# Patient Record
Sex: FEMALE | Race: WHITE | NOT HISPANIC OR LATINO | Employment: OTHER | URBAN - METROPOLITAN AREA
[De-identification: names, ages, dates, MRNs, and addresses within clinical notes are randomized per-mention and may not be internally consistent; named-entity substitution may affect disease eponyms.]

---

## 2017-01-07 ENCOUNTER — APPOINTMENT (EMERGENCY)
Dept: RADIOLOGY | Facility: HOSPITAL | Age: 82
End: 2017-01-07
Payer: MEDICARE

## 2017-01-07 ENCOUNTER — APPOINTMENT (EMERGENCY)
Dept: CT IMAGING | Facility: HOSPITAL | Age: 82
End: 2017-01-07
Payer: MEDICARE

## 2017-01-07 ENCOUNTER — HOSPITAL ENCOUNTER (EMERGENCY)
Facility: HOSPITAL | Age: 82
Discharge: HOME | End: 2017-01-07
Attending: EMERGENCY MEDICINE | Admitting: EMERGENCY MEDICINE
Payer: MEDICARE

## 2017-01-07 VITALS
HEART RATE: 61 BPM | DIASTOLIC BLOOD PRESSURE: 89 MMHG | RESPIRATION RATE: 18 BRPM | WEIGHT: 113.54 LBS | SYSTOLIC BLOOD PRESSURE: 204 MMHG | TEMPERATURE: 97.6 F | OXYGEN SATURATION: 98 %

## 2017-01-07 DIAGNOSIS — S20.229A CONTUSION, BACK: ICD-10-CM

## 2017-01-07 DIAGNOSIS — Z91.81 STATUS POST FALL: Primary | ICD-10-CM

## 2017-01-07 LAB
ALBUMIN SERPL BCP-MCNC: 3.5 G/DL (ref 3.5–5)
ALP SERPL-CCNC: 60 U/L (ref 46–116)
ALT SERPL W P-5'-P-CCNC: 22 U/L (ref 12–78)
ANION GAP SERPL CALCULATED.3IONS-SCNC: 7 MMOL/L (ref 4–13)
AST SERPL W P-5'-P-CCNC: 27 U/L (ref 5–45)
ATRIAL RATE: 62 BPM
BASOPHILS # BLD AUTO: 0.04 THOUSANDS/ΜL (ref 0–0.1)
BASOPHILS NFR BLD AUTO: 0 % (ref 0–1)
BILIRUB SERPL-MCNC: 1.5 MG/DL (ref 0.2–1)
BUN SERPL-MCNC: 29 MG/DL (ref 5–25)
CALCIUM SERPL-MCNC: 8.9 MG/DL (ref 8.3–10.1)
CHLORIDE SERPL-SCNC: 106 MMOL/L (ref 100–108)
CK MB SERPL-MCNC: 1.5 % (ref 0–2.5)
CK MB SERPL-MCNC: 2.9 NG/ML (ref 0–5)
CK SERPL-CCNC: 193 U/L (ref 26–192)
CLARITY, POC: CLEAR
CO2 SERPL-SCNC: 30 MMOL/L (ref 21–32)
COLOR, POC: YELLOW
CREAT SERPL-MCNC: 0.89 MG/DL (ref 0.6–1.3)
EOSINOPHIL # BLD AUTO: 0.11 THOUSAND/ΜL (ref 0–0.61)
EOSINOPHIL NFR BLD AUTO: 1 % (ref 0–6)
ERYTHROCYTE [DISTWIDTH] IN BLOOD BY AUTOMATED COUNT: 13.6 % (ref 11.6–15.1)
EXT BILIRUBIN, UA: NEGATIVE
EXT BLOOD URINE: NEGATIVE
EXT GLUCOSE, UA: NEGATIVE
EXT KETONES: NEGATIVE
EXT NITRITE, UA: NEGATIVE
EXT PH, UA: 6.5
EXT PROTEIN, UA: NORMAL
EXT SPECIFIC GRAVITY, UA: 1.02
EXT UROBILINOGEN: 0.2
GFR SERPL CREATININE-BSD FRML MDRD: 59.9 ML/MIN/1.73SQ M
GLUCOSE SERPL-MCNC: 104 MG/DL (ref 65–140)
HCT VFR BLD AUTO: 37.5 % (ref 34.8–46.1)
HGB BLD-MCNC: 12.2 G/DL (ref 11.5–15.4)
LYMPHOCYTES # BLD AUTO: 1.13 THOUSANDS/ΜL (ref 0.6–4.47)
LYMPHOCYTES NFR BLD AUTO: 10 % (ref 14–44)
MCH RBC QN AUTO: 31.4 PG (ref 26.8–34.3)
MCHC RBC AUTO-ENTMCNC: 32.5 G/DL (ref 31.4–37.4)
MCV RBC AUTO: 96 FL (ref 82–98)
MONOCYTES # BLD AUTO: 0.71 THOUSAND/ΜL (ref 0.17–1.22)
MONOCYTES NFR BLD AUTO: 6 % (ref 4–12)
NEUTROPHILS # BLD AUTO: 9.02 THOUSANDS/ΜL (ref 1.85–7.62)
NEUTS SEG NFR BLD AUTO: 83 % (ref 43–75)
P AXIS: 66 DEGREES
PLATELET # BLD AUTO: 228 THOUSANDS/UL (ref 149–390)
PMV BLD AUTO: 9.1 FL (ref 8.9–12.7)
POTASSIUM SERPL-SCNC: 3.9 MMOL/L (ref 3.5–5.3)
PR INTERVAL: 134 MS
PROT SERPL-MCNC: 6.9 G/DL (ref 6.4–8.2)
QRS AXIS: 30 DEGREES
QRSD INTERVAL: 78 MS
QT INTERVAL: 442 MS
QTC INTERVAL: 442 MS
RBC # BLD AUTO: 3.89 MILLION/UL (ref 3.81–5.12)
SODIUM SERPL-SCNC: 143 MMOL/L (ref 136–145)
T WAVE AXIS: 68 DEGREES
TROPONIN I SERPL-MCNC: <0.02 NG/ML
VENTRICULAR RATE: 60 BPM
WBC # BLD AUTO: 11.01 THOUSAND/UL (ref 4.31–10.16)
WBC # BLD EST: NEGATIVE 10*3/UL

## 2017-01-07 PROCEDURE — 93005 ELECTROCARDIOGRAM TRACING: CPT

## 2017-01-07 PROCEDURE — 85025 COMPLETE CBC W/AUTO DIFF WBC: CPT | Performed by: EMERGENCY MEDICINE

## 2017-01-07 PROCEDURE — 99285 EMERGENCY DEPT VISIT HI MDM: CPT

## 2017-01-07 PROCEDURE — 81002 URINALYSIS NONAUTO W/O SCOPE: CPT | Performed by: EMERGENCY MEDICINE

## 2017-01-07 PROCEDURE — 82550 ASSAY OF CK (CPK): CPT | Performed by: EMERGENCY MEDICINE

## 2017-01-07 PROCEDURE — 36415 COLL VENOUS BLD VENIPUNCTURE: CPT | Performed by: EMERGENCY MEDICINE

## 2017-01-07 PROCEDURE — 72100 X-RAY EXAM L-S SPINE 2/3 VWS: CPT

## 2017-01-07 PROCEDURE — 80053 COMPREHEN METABOLIC PANEL: CPT | Performed by: EMERGENCY MEDICINE

## 2017-01-07 PROCEDURE — 82553 CREATINE MB FRACTION: CPT | Performed by: EMERGENCY MEDICINE

## 2017-01-07 PROCEDURE — 70450 CT HEAD/BRAIN W/O DYE: CPT

## 2017-01-07 PROCEDURE — 72125 CT NECK SPINE W/O DYE: CPT

## 2017-01-07 PROCEDURE — 72070 X-RAY EXAM THORAC SPINE 2VWS: CPT

## 2017-01-07 PROCEDURE — 73502 X-RAY EXAM HIP UNI 2-3 VIEWS: CPT

## 2017-01-07 PROCEDURE — 84484 ASSAY OF TROPONIN QUANT: CPT | Performed by: EMERGENCY MEDICINE

## 2017-01-07 RX ORDER — ACETAMINOPHEN 325 MG/1
650 TABLET ORAL ONCE
Status: DISCONTINUED | OUTPATIENT
Start: 2017-01-07 | End: 2017-01-07 | Stop reason: HOSPADM

## 2017-01-09 ENCOUNTER — APPOINTMENT (EMERGENCY)
Dept: RADIOLOGY | Facility: HOSPITAL | Age: 82
End: 2017-01-09
Payer: MEDICARE

## 2017-01-09 ENCOUNTER — HOSPITAL ENCOUNTER (EMERGENCY)
Facility: HOSPITAL | Age: 82
Discharge: HOME | End: 2017-01-09
Attending: EMERGENCY MEDICINE | Admitting: EMERGENCY MEDICINE
Payer: MEDICARE

## 2017-01-09 ENCOUNTER — GENERIC CONVERSION - ENCOUNTER (OUTPATIENT)
Dept: OTHER | Facility: OTHER | Age: 82
End: 2017-01-09

## 2017-01-09 VITALS
RESPIRATION RATE: 16 BRPM | TEMPERATURE: 97.8 F | SYSTOLIC BLOOD PRESSURE: 189 MMHG | OXYGEN SATURATION: 99 % | WEIGHT: 127.9 LBS | HEART RATE: 61 BPM | DIASTOLIC BLOOD PRESSURE: 55 MMHG

## 2017-01-09 DIAGNOSIS — IMO0001 FALL IN ELDERLY PATIENT, INITIAL ENCOUNTER: ICD-10-CM

## 2017-01-09 DIAGNOSIS — S40.012A CONTUSION OF LEFT SHOULDER, INITIAL ENCOUNTER: Primary | ICD-10-CM

## 2017-01-09 PROCEDURE — 73030 X-RAY EXAM OF SHOULDER: CPT

## 2017-01-09 PROCEDURE — 99284 EMERGENCY DEPT VISIT MOD MDM: CPT

## 2017-01-12 ENCOUNTER — TRANSCRIBE ORDERS (OUTPATIENT)
Dept: ADMINISTRATIVE | Facility: HOSPITAL | Age: 82
End: 2017-01-12

## 2017-01-18 ENCOUNTER — GENERIC CONVERSION - ENCOUNTER (OUTPATIENT)
Dept: OTHER | Facility: OTHER | Age: 82
End: 2017-01-18

## 2017-01-20 ENCOUNTER — GENERIC CONVERSION - ENCOUNTER (OUTPATIENT)
Dept: OTHER | Facility: OTHER | Age: 82
End: 2017-01-20

## 2017-02-13 ENCOUNTER — GENERIC CONVERSION - ENCOUNTER (OUTPATIENT)
Dept: OTHER | Facility: OTHER | Age: 82
End: 2017-02-13

## 2017-02-18 ENCOUNTER — HOSPITAL ENCOUNTER (EMERGENCY)
Facility: HOSPITAL | Age: 82
Discharge: HOME | End: 2017-02-18
Attending: EMERGENCY MEDICINE
Payer: MEDICARE

## 2017-02-18 VITALS
RESPIRATION RATE: 18 BRPM | OXYGEN SATURATION: 94 % | TEMPERATURE: 97.4 F | SYSTOLIC BLOOD PRESSURE: 116 MMHG | DIASTOLIC BLOOD PRESSURE: 57 MMHG | HEART RATE: 74 BPM

## 2017-02-18 DIAGNOSIS — S30.0XXA CONTUSION, BUTTOCK, INITIAL ENCOUNTER: ICD-10-CM
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
## 2017-02-18 DIAGNOSIS — F03.90 DEMENTIA (HCC): ICD-10-CM

## 2017-02-18 PROCEDURE — 99284 EMERGENCY DEPT VISIT MOD MDM: CPT

## 2017-02-23 ENCOUNTER — GENERIC CONVERSION - ENCOUNTER (OUTPATIENT)
Dept: OTHER | Facility: OTHER | Age: 82
End: 2017-02-23

## 2017-02-28 ENCOUNTER — GENERIC CONVERSION - ENCOUNTER (OUTPATIENT)
Dept: OTHER | Facility: OTHER | Age: 82
End: 2017-02-28

## 2017-03-11 ENCOUNTER — HOSPITAL ENCOUNTER (EMERGENCY)
Facility: HOSPITAL | Age: 82
Discharge: NURSING, REHAB OR LONG-TERM CARE | End: 2017-03-11
Attending: EMERGENCY MEDICINE | Admitting: EMERGENCY MEDICINE
Payer: MEDICARE

## 2017-03-11 VITALS
HEART RATE: 65 BPM | TEMPERATURE: 97.2 F | OXYGEN SATURATION: 97 % | RESPIRATION RATE: 16 BRPM | SYSTOLIC BLOOD PRESSURE: 179 MMHG | DIASTOLIC BLOOD PRESSURE: 78 MMHG
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
## 2017-03-11 PROCEDURE — 99284 EMERGENCY DEPT VISIT MOD MDM: CPT

## 2017-03-11 RX ORDER — ALPRAZOLAM 0.25 MG/1
0.25 TABLET ORAL 2 TIMES DAILY
COMMUNITY

## 2017-04-20 ENCOUNTER — HOSPITAL ENCOUNTER (INPATIENT)
Facility: HOSPITAL | Age: 82
LOS: 1 days | Discharge: HOME | DRG: 093 | End: 2017-04-21
Attending: EMERGENCY MEDICINE | Admitting: INTERNAL MEDICINE
Payer: MEDICARE

## 2017-04-20 ENCOUNTER — APPOINTMENT (INPATIENT)
Dept: MRI IMAGING | Facility: HOSPITAL | Age: 82
DRG: 093 | End: 2017-04-20
Payer: MEDICARE

## 2017-04-20 ENCOUNTER — APPOINTMENT (EMERGENCY)
Dept: CT IMAGING | Facility: HOSPITAL | Age: 82
DRG: 093 | End: 2017-04-20
Payer: MEDICARE

## 2017-04-20 ENCOUNTER — APPOINTMENT (EMERGENCY)
Dept: RADIOLOGY | Facility: HOSPITAL | Age: 82
DRG: 093 | End: 2017-04-20
Payer: MEDICARE

## 2017-04-20 DIAGNOSIS — F03.90 DEMENTIA (HCC): Chronic | ICD-10-CM

## 2017-04-20 DIAGNOSIS — R29.810 FACIAL DROOP: Primary | ICD-10-CM

## 2017-04-20 DIAGNOSIS — Z86.73 HISTORY OF TIA (TRANSIENT ISCHEMIC ATTACK): Chronic | ICD-10-CM

## 2017-04-20 PROBLEM — I25.10 CAD (CORONARY ARTERY DISEASE): Chronic | Status: ACTIVE | Noted: 2017-04-20

## 2017-04-20 PROBLEM — I10 ESSENTIAL HYPERTENSION: Chronic | Status: ACTIVE | Noted: 2017-04-20

## 2017-04-20 PROBLEM — E78.5 HLD (HYPERLIPIDEMIA): Chronic | Status: ACTIVE | Noted: 2017-04-20

## 2017-04-20 LAB
ABO GROUP BLD: NORMAL
ANION GAP SERPL CALCULATED.3IONS-SCNC: 6 MMOL/L (ref 4–13)
APTT PPP: 25 SECONDS (ref 24–36)
BLD GP AB SCN SERPL QL: NEGATIVE
BUN SERPL-MCNC: 24 MG/DL (ref 5–25)
CALCIUM SERPL-MCNC: 8.8 MG/DL (ref 8.3–10.1)
CHLORIDE SERPL-SCNC: 107 MMOL/L (ref 100–108)
CO2 SERPL-SCNC: 30 MMOL/L (ref 21–32)
CREAT SERPL-MCNC: 0.86 MG/DL (ref 0.6–1.3)
ERYTHROCYTE [DISTWIDTH] IN BLOOD BY AUTOMATED COUNT: 13.5 % (ref 11.6–15.1)
GFR SERPL CREATININE-BSD FRML MDRD: >60 ML/MIN/1.73SQ M
GLUCOSE SERPL-MCNC: 99 MG/DL (ref 65–140)
HCT VFR BLD AUTO: 36.5 % (ref 34.8–46.1)
HGB BLD-MCNC: 11.3 G/DL (ref 11.5–15.4)
INR PPP: 1.01 (ref 0.86–1.16)
MCH RBC QN AUTO: 30.2 PG (ref 26.8–34.3)
MCHC RBC AUTO-ENTMCNC: 31 G/DL (ref 31.4–37.4)
MCV RBC AUTO: 98 FL (ref 82–98)
PA ADP BLD-ACNC: 491 ARU
PLATELET # BLD AUTO: 236 THOUSANDS/UL (ref 149–390)
PMV BLD AUTO: 9.3 FL (ref 8.9–12.7)
POTASSIUM SERPL-SCNC: 4.4 MMOL/L (ref 3.5–5.3)
PROTHROMBIN TIME: 13.1 SECONDS (ref 12–14.3)
RBC # BLD AUTO: 3.74 MILLION/UL (ref 3.81–5.12)
RH BLD: POSITIVE
SODIUM SERPL-SCNC: 143 MMOL/L (ref 136–145)
SPECIMEN EXPIRATION DATE: NORMAL
TROPONIN I SERPL-MCNC: <0.02 NG/ML
WBC # BLD AUTO: 12.72 THOUSAND/UL (ref 4.31–10.16)

## 2017-04-20 PROCEDURE — 85027 COMPLETE CBC AUTOMATED: CPT | Performed by: PHYSICIAN ASSISTANT

## 2017-04-20 PROCEDURE — 86900 BLOOD TYPING SEROLOGIC ABO: CPT | Performed by: PHYSICIAN ASSISTANT

## 2017-04-20 PROCEDURE — 70551 MRI BRAIN STEM W/O DYE: CPT

## 2017-04-20 PROCEDURE — 86901 BLOOD TYPING SEROLOGIC RH(D): CPT | Performed by: PHYSICIAN ASSISTANT

## 2017-04-20 PROCEDURE — 36415 COLL VENOUS BLD VENIPUNCTURE: CPT | Performed by: PHYSICIAN ASSISTANT

## 2017-04-20 PROCEDURE — 99285 EMERGENCY DEPT VISIT HI MDM: CPT

## 2017-04-20 PROCEDURE — 93005 ELECTROCARDIOGRAM TRACING: CPT | Performed by: PHYSICIAN ASSISTANT

## 2017-04-20 PROCEDURE — 70450 CT HEAD/BRAIN W/O DYE: CPT

## 2017-04-20 PROCEDURE — 80048 BASIC METABOLIC PNL TOTAL CA: CPT | Performed by: PHYSICIAN ASSISTANT

## 2017-04-20 PROCEDURE — 85730 THROMBOPLASTIN TIME PARTIAL: CPT | Performed by: PHYSICIAN ASSISTANT

## 2017-04-20 PROCEDURE — 84484 ASSAY OF TROPONIN QUANT: CPT | Performed by: PHYSICIAN ASSISTANT

## 2017-04-20 PROCEDURE — 71010 HB CHEST X-RAY 1 VIEW FRONTAL (PORTABLE): CPT

## 2017-04-20 PROCEDURE — 85610 PROTHROMBIN TIME: CPT | Performed by: PHYSICIAN ASSISTANT

## 2017-04-20 PROCEDURE — 86850 RBC ANTIBODY SCREEN: CPT | Performed by: PHYSICIAN ASSISTANT

## 2017-04-20 PROCEDURE — 85576 BLOOD PLATELET AGGREGATION: CPT | Performed by: PHYSICIAN ASSISTANT

## 2017-04-20 RX ORDER — ACETAMINOPHEN 325 MG/1
650 TABLET ORAL EVERY 6 HOURS PRN
Status: DISCONTINUED | OUTPATIENT
Start: 2017-04-20 | End: 2017-04-21 | Stop reason: HOSPADM

## 2017-04-20 RX ORDER — ASPIRIN 81 MG/1
324 TABLET, CHEWABLE ORAL DAILY
Status: DISCONTINUED | OUTPATIENT
Start: 2017-04-21 | End: 2017-04-21 | Stop reason: HOSPADM

## 2017-04-20 RX ORDER — ONDANSETRON 2 MG/ML
4 INJECTION INTRAMUSCULAR; INTRAVENOUS EVERY 6 HOURS PRN
Status: DISCONTINUED | OUTPATIENT
Start: 2017-04-20 | End: 2017-04-21 | Stop reason: HOSPADM

## 2017-04-20 RX ORDER — ECHINACEA PURPUREA EXTRACT 125 MG
2 TABLET ORAL AS NEEDED
Status: DISCONTINUED | OUTPATIENT
Start: 2017-04-20 | End: 2017-04-21 | Stop reason: HOSPADM

## 2017-04-20 RX ORDER — LORAZEPAM 2 MG/ML
1 INJECTION INTRAMUSCULAR ONCE
Status: COMPLETED | OUTPATIENT
Start: 2017-04-20 | End: 2017-04-20

## 2017-04-20 RX ORDER — ATORVASTATIN CALCIUM 40 MG/1
80 TABLET, FILM COATED ORAL
Status: DISCONTINUED | OUTPATIENT
Start: 2017-04-20 | End: 2017-04-21 | Stop reason: HOSPADM

## 2017-04-20 RX ORDER — SENNOSIDES 8.6 MG
1 TABLET ORAL DAILY
Status: DISCONTINUED | OUTPATIENT
Start: 2017-04-21 | End: 2017-04-21 | Stop reason: HOSPADM

## 2017-04-20 RX ORDER — DOCUSATE SODIUM 100 MG/1
100 CAPSULE, LIQUID FILLED ORAL 2 TIMES DAILY
Status: DISCONTINUED | OUTPATIENT
Start: 2017-04-20 | End: 2017-04-21 | Stop reason: HOSPADM

## 2017-04-20 RX ORDER — CITALOPRAM 10 MG/1
10 TABLET ORAL DAILY
COMMUNITY
End: 2017-06-08

## 2017-04-20 RX ORDER — CITALOPRAM 20 MG/1
10 TABLET ORAL DAILY
Status: DISCONTINUED | OUTPATIENT
Start: 2017-04-21 | End: 2017-04-21 | Stop reason: HOSPADM

## 2017-04-20 RX ORDER — ALPRAZOLAM 0.25 MG/1
0.25 TABLET ORAL EVERY 12 HOURS PRN
Status: DISCONTINUED | OUTPATIENT
Start: 2017-04-20 | End: 2017-04-21 | Stop reason: HOSPADM

## 2017-04-20 RX ORDER — MELATONIN
1000 DAILY
Status: DISCONTINUED | OUTPATIENT
Start: 2017-04-21 | End: 2017-04-21 | Stop reason: HOSPADM

## 2017-04-20 RX ORDER — ATENOLOL 25 MG/1
12.5 TABLET ORAL DAILY
Status: DISCONTINUED | OUTPATIENT
Start: 2017-04-21 | End: 2017-04-21 | Stop reason: HOSPADM

## 2017-04-20 RX ORDER — SODIUM CHLORIDE 9 MG/ML
75 INJECTION, SOLUTION INTRAVENOUS ONCE
Status: COMPLETED | OUTPATIENT
Start: 2017-04-20 | End: 2017-04-20

## 2017-04-20 RX ORDER — MEMANTINE HYDROCHLORIDE 10 MG/1
10 TABLET ORAL 2 TIMES DAILY
Status: DISCONTINUED | OUTPATIENT
Start: 2017-04-20 | End: 2017-04-21 | Stop reason: HOSPADM

## 2017-04-20 RX ORDER — FOLIC ACID 1 MG/1
1 TABLET ORAL DAILY
Status: DISCONTINUED | OUTPATIENT
Start: 2017-04-21 | End: 2017-04-21 | Stop reason: HOSPADM

## 2017-04-20 RX ADMIN — BIMATOPROST 1 DROP: 0.1 SOLUTION/ DROPS OPHTHALMIC at 21:32

## 2017-04-20 RX ADMIN — SODIUM CHLORIDE 75 ML/HR: 0.9 INJECTION, SOLUTION INTRAVENOUS at 18:47

## 2017-04-20 RX ADMIN — SODIUM CHLORIDE 1000 ML: 0.9 INJECTION, SOLUTION INTRAVENOUS at 15:15

## 2017-04-20 RX ADMIN — LORAZEPAM 1 MG: 2 INJECTION, SOLUTION INTRAMUSCULAR; INTRAVENOUS at 20:40

## 2017-04-21 VITALS
WEIGHT: 129 LBS | HEART RATE: 75 BPM | OXYGEN SATURATION: 95 % | BODY MASS INDEX: 22.02 KG/M2 | RESPIRATION RATE: 16 BRPM | HEIGHT: 64 IN | TEMPERATURE: 97.2 F | DIASTOLIC BLOOD PRESSURE: 70 MMHG | SYSTOLIC BLOOD PRESSURE: 130 MMHG

## 2017-04-21 LAB
ANION GAP SERPL CALCULATED.3IONS-SCNC: 9 MMOL/L (ref 4–13)
BUN SERPL-MCNC: 16 MG/DL (ref 5–25)
CALCIUM SERPL-MCNC: 8.1 MG/DL (ref 8.3–10.1)
CHLORIDE SERPL-SCNC: 105 MMOL/L (ref 100–108)
CHOLEST SERPL-MCNC: 148 MG/DL (ref 50–200)
CO2 SERPL-SCNC: 27 MMOL/L (ref 21–32)
CREAT SERPL-MCNC: 0.67 MG/DL (ref 0.6–1.3)
ERYTHROCYTE [DISTWIDTH] IN BLOOD BY AUTOMATED COUNT: 13.4 % (ref 11.6–15.1)
GFR SERPL CREATININE-BSD FRML MDRD: >60 ML/MIN/1.73SQ M
GLUCOSE SERPL-MCNC: 90 MG/DL (ref 65–140)
HCT VFR BLD AUTO: 33.7 % (ref 34.8–46.1)
HDLC SERPL-MCNC: 85 MG/DL (ref 40–60)
HGB BLD-MCNC: 10.6 G/DL (ref 11.5–15.4)
LDLC SERPL CALC-MCNC: 51 MG/DL (ref 0–100)
MAGNESIUM SERPL-MCNC: 2.1 MG/DL (ref 1.6–2.6)
MCH RBC QN AUTO: 30.1 PG (ref 26.8–34.3)
MCHC RBC AUTO-ENTMCNC: 31.5 G/DL (ref 31.4–37.4)
MCV RBC AUTO: 96 FL (ref 82–98)
PLATELET # BLD AUTO: 212 THOUSANDS/UL (ref 149–390)
PMV BLD AUTO: 8.9 FL (ref 8.9–12.7)
POTASSIUM SERPL-SCNC: 4.8 MMOL/L (ref 3.5–5.3)
RBC # BLD AUTO: 3.52 MILLION/UL (ref 3.81–5.12)
SODIUM SERPL-SCNC: 141 MMOL/L (ref 136–145)
TRIGL SERPL-MCNC: 62 MG/DL
WBC # BLD AUTO: 6.66 THOUSAND/UL (ref 4.31–10.16)

## 2017-04-21 PROCEDURE — G8978 MOBILITY CURRENT STATUS: HCPCS

## 2017-04-21 PROCEDURE — 92610 EVALUATE SWALLOWING FUNCTION: CPT

## 2017-04-21 PROCEDURE — 97163 PT EVAL HIGH COMPLEX 45 MIN: CPT

## 2017-04-21 PROCEDURE — G8979 MOBILITY GOAL STATUS: HCPCS

## 2017-04-21 PROCEDURE — 83735 ASSAY OF MAGNESIUM: CPT | Performed by: PHYSICIAN ASSISTANT

## 2017-04-21 PROCEDURE — 85027 COMPLETE CBC AUTOMATED: CPT | Performed by: PHYSICIAN ASSISTANT

## 2017-04-21 PROCEDURE — 80048 BASIC METABOLIC PNL TOTAL CA: CPT | Performed by: PHYSICIAN ASSISTANT

## 2017-04-21 PROCEDURE — 97110 THERAPEUTIC EXERCISES: CPT

## 2017-04-21 PROCEDURE — 80061 LIPID PANEL: CPT | Performed by: PHYSICIAN ASSISTANT

## 2017-04-21 RX ADMIN — ASPIRIN 324 MG: 81 TABLET, CHEWABLE ORAL at 11:31

## 2017-04-21 RX ADMIN — DESMOPRESSIN ACETATE 80 MG: 0.2 TABLET ORAL at 16:41

## 2017-04-21 RX ADMIN — ATENOLOL 12.5 MG: 25 TABLET ORAL at 11:31

## 2017-04-21 RX ADMIN — CITALOPRAM HYDROBROMIDE 10 MG: 20 TABLET ORAL at 11:32

## 2017-04-21 RX ADMIN — ENOXAPARIN SODIUM 40 MG: 40 INJECTION SUBCUTANEOUS at 08:35

## 2017-04-21 RX ADMIN — FOLIC ACID 1 MG: 1 TABLET ORAL at 11:33

## 2017-04-21 RX ADMIN — MEMANTINE 10 MG: 10 TABLET ORAL at 11:33

## 2017-04-29 ENCOUNTER — HOSPITAL ENCOUNTER (EMERGENCY)
Facility: HOSPITAL | Age: 82
Discharge: HOME | End: 2017-04-30
Attending: EMERGENCY MEDICINE
Payer: MEDICARE

## 2017-04-29 DIAGNOSIS — S09.90XA HEAD INJURY, INITIAL ENCOUNTER: Primary | ICD-10-CM

## 2017-04-29 DIAGNOSIS — S50.312A: ICD-10-CM

## 2017-04-30 ENCOUNTER — APPOINTMENT (EMERGENCY)
Dept: CT IMAGING | Facility: HOSPITAL | Age: 82
End: 2017-04-30
Payer: MEDICARE

## 2017-04-30 VITALS
TEMPERATURE: 97.1 F | SYSTOLIC BLOOD PRESSURE: 187 MMHG | BODY MASS INDEX: 20.47 KG/M2 | WEIGHT: 119.27 LBS | HEART RATE: 76 BPM | RESPIRATION RATE: 16 BRPM | OXYGEN SATURATION: 97 % | DIASTOLIC BLOOD PRESSURE: 79 MMHG

## 2017-04-30 PROCEDURE — 99284 EMERGENCY DEPT VISIT MOD MDM: CPT

## 2017-04-30 PROCEDURE — 70450 CT HEAD/BRAIN W/O DYE: CPT

## 2017-04-30 RX ORDER — GINSENG 100 MG
1 CAPSULE ORAL ONCE
Status: COMPLETED | OUTPATIENT
Start: 2017-04-30 | End: 2017-04-30

## 2017-04-30 RX ADMIN — BACITRACIN ZINC 1 SMALL APPLICATION: 500 OINTMENT TOPICAL at 01:23

## 2017-06-07 ENCOUNTER — HOSPITAL ENCOUNTER (EMERGENCY)
Facility: HOSPITAL | Age: 82
Discharge: NURSING, REHAB OR LONG-TERM CARE | End: 2017-06-08
Attending: EMERGENCY MEDICINE | Admitting: EMERGENCY MEDICINE
Payer: MEDICARE

## 2017-06-07 ENCOUNTER — APPOINTMENT (EMERGENCY)
Dept: CT IMAGING | Facility: HOSPITAL | Age: 82
End: 2017-06-07
Payer: MEDICARE

## 2017-06-07 DIAGNOSIS — S70.11XA TRAUMATIC HEMATOMA OF RIGHT THIGH, INITIAL ENCOUNTER: Primary | ICD-10-CM

## 2017-06-07 PROCEDURE — 73700 CT LOWER EXTREMITY W/O DYE: CPT

## 2017-06-07 RX ORDER — SACCHAROMYCES BOULARDII 250 MG
250 CAPSULE ORAL 2 TIMES DAILY
COMMUNITY

## 2017-06-07 RX ORDER — BISACODYL 10 MG
10 SUPPOSITORY, RECTAL RECTAL AS NEEDED
COMMUNITY

## 2017-06-07 RX ORDER — RISPERIDONE 0.25 MG/1
0.25 TABLET, FILM COATED ORAL 2 TIMES DAILY
COMMUNITY

## 2017-06-07 RX ORDER — CIPROFLOXACIN 250 MG/1
250 TABLET, FILM COATED ORAL 2 TIMES DAILY
COMMUNITY

## 2017-06-07 RX ORDER — MIRTAZAPINE 15 MG/1
15 TABLET, FILM COATED ORAL
COMMUNITY

## 2017-06-08 ENCOUNTER — HOSPITAL ENCOUNTER (EMERGENCY)
Facility: HOSPITAL | Age: 82
Discharge: HOME | End: 2017-06-08
Attending: EMERGENCY MEDICINE | Admitting: EMERGENCY MEDICINE
Payer: MEDICARE

## 2017-06-08 VITALS
OXYGEN SATURATION: 97 % | HEART RATE: 74 BPM | TEMPERATURE: 98.1 F | DIASTOLIC BLOOD PRESSURE: 82 MMHG | RESPIRATION RATE: 18 BRPM | BODY MASS INDEX: 21.34 KG/M2 | SYSTOLIC BLOOD PRESSURE: 203 MMHG | WEIGHT: 124.34 LBS

## 2017-06-08 VITALS
TEMPERATURE: 99.2 F | BODY MASS INDEX: 20.25 KG/M2 | DIASTOLIC BLOOD PRESSURE: 49 MMHG | RESPIRATION RATE: 17 BRPM | OXYGEN SATURATION: 98 % | WEIGHT: 118 LBS | SYSTOLIC BLOOD PRESSURE: 104 MMHG | HEART RATE: 59 BPM

## 2017-06-08 DIAGNOSIS — I82.409 DVT (DEEP VENOUS THROMBOSIS) (HCC): Primary | ICD-10-CM

## 2017-06-08 PROCEDURE — 96372 THER/PROPH/DIAG INJ SC/IM: CPT

## 2017-06-08 PROCEDURE — 99284 EMERGENCY DEPT VISIT MOD MDM: CPT

## 2017-06-08 PROCEDURE — 99283 EMERGENCY DEPT VISIT LOW MDM: CPT

## 2017-06-08 RX ADMIN — ENOXAPARIN SODIUM 60 MG: 60 INJECTION SUBCUTANEOUS at 14:52

## 2018-01-13 NOTE — MISCELLANEOUS
Message   Recorded as Task   Date: 11/04/2016 11:04 AM, Created By: Nerissa Griffin   Task Name: Intake   Assigned To: Jesse Parra   Regarding Patient: Ector Ashby, Status: Active   CommentDayle Ast - 04 Nov 2016 11:04 AM     TASK CREATED  Caller: Maurice Dailey, Nurse Navigator; Nursing Home Report; [phone]  Building 4 second floor nurse  Calling to report patient was sitting at the foot of her bed with the refrigarator door open with walker laying on side of her  Appears to have fallen no visual witness to it happening, but patient was on floor siting on buttox  Small tiny abression on spine of back, very minor no wound care is needed          Signatures   Electronically signed by : Selma Heimlich, M D ; Nov 6 2016 12:12PM EST                       (Author)

## 2018-01-15 NOTE — MISCELLANEOUS
Message   Recorded as Task   Date: 01/09/2017 08:53 AM, Created By: oLy Gallardo   Task Name: Miscellaneous   Assigned To: Jesse Parra   Regarding Patient: Margarita Wall, Status: Active   Maria L Perdomo - 09 Jan 2017 8:53 Adina Pac [address] Rosie Noriega  Just an FYI on your pt Claribel  I received calls from the [facility] this weekend in regards of [address] [place] [address] Road  They called just to notify for falls  She fell 2 times on sat, after 2nd time she was sent to ER, work up neg  Sunday fell down again but her son refused to send to ER again  She was put on neuro checks q 15min and falls precautions  I spoke with nurse who was same one both days (Naomi Kaufman) and she thinks likely related to her dementia and OA       tx   She also has been refusing her meds at times  Clearly lacks the decision making capacity to understand or reason  Only critical meds are her Atenolol and ASA  Statin helpful because of hx of stroke / CVD but at this point not worth "fighting" with her       Signatures   Electronically signed by : PHANI Guevara ; Jan 9 2017 10:31AM EST                       (Author)

## 2018-01-15 NOTE — PROGRESS NOTES
Assessment    1  Acute upper respiratory infection (465 9) (J06 9)   2  Acute bronchitis (466 0) (J20 9)   3  Benign essential hypertension (401 1) (I10)   4  Ambulatory dysfunction (719 7) (R26 2)    Plan  Acute bronchitis, Acute upper respiratory infection    · Azithromycin 250 MG Oral Tablet; take 2 tab 1st day, then 1 tab daily for 4 days   · Mucinex 600 MG Oral Tablet Extended Release 12 Hour; TAKE 1 TABLET TWICE  DAILY  Ambulatory dysfunction    · Walker Rigid Wheeled W/ Seat; Status:Complete;   Done: 62GYC8805    Discussion/Summary    1 URI /Acute bronchitis - start Zithromax for 5 days, Mucinex 600 mg twice daily  Advised to increase PO fluid intake, take Tylenol PRN  Call office if symptoms persist or worsen  2 HTN - stable  Continue Atenolol 25 mg daily  3 Ambulatory dysfunction - discussed fall precautions with patient  Prescription was given for a walker  Encouraged to use an assistive device for ambulation to prevent falls  The patient, patient's family was counseled regarding instructions for management, risk factor reductions, risks and benefits of treatment options, importance of compliance with treatment  Possible side effects of new medications were reviewed with the patient/guardian today  The treatment plan was reviewed with the patient/guardian  The patient/guardian understands and agrees with the treatment plan      Chief Complaint  Patient is here for a sick visit  Patient c/o cough for 2-3 days, fatigue, nasal congestion  History of Present Illness  HPI: Patient is 41-year-old female who presents to the office accompanied by her son c/o productive cough, nasal congestion for the last 2- 3 days, feeling tired  Denies wheezing, chest tightness, SOB  No fever, no chills  Former smoker  Patient has HTN  BP is stable on Atenolol 25 mg daily  Patient has ambulatory dysfunction  She ambulates with a walker    Patient's son is asking for prescription to get a new walker  No H/o recent falls  Review of Systems    Constitutional: feeling tired, but no fever and no chills  ENT: hearing loss and nasal congestion, but no earache, no nosebleeds, no sore throat, no nasal discharge and no hoarseness  Cardiovascular: no chest pain, no intermittent leg claudication, no palpitations and no lower extremity edema  Respiratory: no shortness of breath and no wheezing    The patient presents with complaints of cough, described as productive starting about 3 days ago  Symptoms are worsening  Gastrointestinal: no abdominal pain, no nausea, no vomiting, no constipation, no diarrhea and no blood in stools  Genitourinary: incontinence, but no dysuria and no pelvic pain  The patient presents with complaints of lower back and bilateral knee arthralgias  Integumentary: rash, but no itching  Neurological: no headache and no dizziness  Active Problems    1  Abnormal loss of weight (783 21) (R63 4)   2  Ambulatory dysfunction (719 7) (R26 2)   3  Benign essential hypertension (401 1) (I10)   4  Cellulitis of left upper extremity (682 3) (L03 114)   5  Dermatitis (692 9) (L30 9)   6  Dupuytren's contracture (728 6) (M72 0)   7  Enlarged thyroid gland (240 9) (E04 9)   8  Fibrocystic breast disease, unspecified laterality (610 1) (N60 19)   9  Frequency of urination (788 41) (R35 0)   10  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 4)   11  Glaucoma (365 9) (H40 9)   12  Hyperhomocysteinemia (270 4) (E72 11)   13  Lipoma (214 9) (D17 9)   14  Mild cognitive impairment (331 83) (G31 84)   15  Need for immunization against influenza (V04 81) (Z23)   16  Need for pneumococcal vaccination (V03 82) (Z23)   17  Onychomycosis (110 1) (B35 1)   18  Osteoporosis (733 00) (M81 0)   19  Psoriasis (696 1) (L40 9)   20  Stroke (434 91) (I63 9)   21  Trigger middle finger of left hand (727 03) (M65 332)   22  Urge incontinence of urine (788 31) (N39 41)   23   Urinary tract infection, acute (599 0) (N39 0)    Past Medical History    1  History of A Fall Due To Slipping, Tripping, Or Stumbling (E885 9)   2  History of Arthralgia of left hand (719 44) (M79 642)   3  History of Arthralgia of right hand (719 44) (M79 641)   4  History of Contusion Of The Posterior Scalp With Intact Skin Surface (920)   5  History of Dyshidrotic eczema (705 81) (L30 1)   6  History of Grieving (309 0) (F43 20)   7  History of allergy (V15 09) (Z88 9)   8  History of basal cell carcinoma (V10 83) (Z85 828)   9  History of sciatica (V12 49) (Z86 69)   10  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)   11  History of Hyperthyroidism (242 90) (E05 90)   12  History of Irritable bowel syndrome (564 1) (K58 9)   13  History of Lyme disease (088 81) (A69 20)   14  History of Skin lesion of face (709 9) (L98 9)   15  History of Skin lesion of right arm (709 9) (L98 9)  Active Problems And Past Medical History Reviewed: The active problems and past medical history were reviewed and updated today  Family History    1  Family history of Stroke Syndrome (V17 1)    2  Family history of Stroke Syndrome (V17 1)    Social History    · Being A Social Drinker   · 1-2 beers per day   · Caffeine Use   · 2 cups coffee per day   · Daily Coffee Consumption (2  Cups/Day)   · Former smoker   · Quit 1970  · Marital History - Currently    · Robert Ramirez  The social history was reviewed and updated today  Surgical History    1  History of Treatment Of Forearm Fracture    Current Meds   1  Aspirin 81 MG Oral Tablet Delayed Release; TAKE 1 TABLET DAILY; Therapy: 18GXD2899 to (Evaluate:14Jun2012) Recorded   2  Atenolol 25 MG Oral Tablet; TAKE 1 TABLET EVERY DAY; Therapy: (Recorded:02Mar2015) to Recorded   3  Atorvastatin Calcium 40 MG Oral Tablet; TAKE 1 TABLET BY MOUTH ONCE DAILY    Requested for: 22HDW1630; Last Rx:04Uob4611 Ordered   4  Folic Acid 1 MG Oral Tablet; Take 1 tablet daily;    Therapy: 05SDO5698 to (Evaluate:28Zec4047) Requested for: 22LEO7233; Last   Rx:17Jan2016 Ordered   5  Lumigan 0 01 % Ophthalmic Solution; Therapy: 26SVF2341 to (Evaluate:14Nov2014) Recorded   6  Vitamin D3 1000 UNIT Oral Capsule; 1 caps daily; Therapy: 78UCH0916 to Recorded    The medication list was reviewed and updated today  Allergies    1  Codeine Derivatives   2  Amoxicillin TABS   3  Codeine Sulfate TABS   4  Niacin TABS   5  Simvastatin TABS    Vitals   Recorded: 24GIG9936 03:12PM   Temperature 97 9 F   Heart Rate 81   Systolic 028, RUE, Sitting   Diastolic 68, RUE, Sitting   Height 5 ft 4 5 in   Weight 116 lb 2 oz   BMI Calculated 19 62   BSA Calculated 1 56   O2 Saturation 97, RA     Physical Exam    Constitutional   General appearance: No acute distress, well appearing and well nourished  Eyes   Conjunctiva and lids: No swelling, erythema or discharge  Pupils and irises: Equal, round and reactive to light  Ears, Nose, Mouth, and Throat   External inspection of ears and nose: Normal     Otoscopic examination: Tympanic membranes translucent with normal light reflex  Canals patent without erythema  Nasal mucosa, septum, and turbinates: Normal without edema or erythema  Oropharynx: Abnormal   The posterior pharynx was erythematous, but did not have an exudate  Pulmonary   Respiratory effort: No increased work of breathing or signs of respiratory distress  Auscultation of lungs: Abnormal   Auscultation of the lungs revealed coarse breath sounds BL  Cardiovascular   Auscultation of heart: Normal rate and rhythm, normal S1 and S2, without murmurs  Examination of extremities for edema and/or varicosities: Normal     Abdomen   Abdomen: Non-tender, no masses  Musculoskeletal   Gait and station: Abnormal   Unsteady gait, ambulates with a walker  Digits and nails: Normal without clubbing or cyanosis      Inspection/palpation of joints, bones, and muscles: Normal     Skin   Skin and subcutaneous tissue: Abnormal   dry skin   No rashes  Psychiatric   Mood and affect: Normal          Future Appointments    Date/Time Provider Specialty Site   03/10/2016 03:40 PM Isobel Carrel, M D   [facility] [address]     Signatures   Electronically signed by : PHANI Kim ; Feb 1 2016  3:50PM EST                       (Author)

## 2018-01-15 NOTE — MISCELLANEOUS
Message   Recorded as Task   Date: 02/15/2016 02:29 PM, Created By: Vicky Hernandez   Task Name: Miscellaneous   Assigned To: Jesse Parra   Regarding Patient: Nella Mclain, Status: Active   Comment:    Nicole Kunz - 15 Feb 2016 2:29 PM     TASK CREATED  Sobia [place] called to let you know Jamie Foley is Now at [facility] in [place]          Signatures   Electronically signed by : PHANI Banegas ; Feb 15 2016  5:37PM EST                       (Author)

## 2018-06-17 ENCOUNTER — HOSPITAL ENCOUNTER (EMERGENCY)
Facility: HOSPITAL | Age: 83
Discharge: NURSING, REHAB OR LONG-TERM CARE | End: 2018-06-17
Attending: EMERGENCY MEDICINE
Payer: MEDICARE

## 2018-06-17 VITALS
HEART RATE: 114 BPM | OXYGEN SATURATION: 93 % | SYSTOLIC BLOOD PRESSURE: 111 MMHG | RESPIRATION RATE: 14 BRPM | TEMPERATURE: 95.5 F | DIASTOLIC BLOOD PRESSURE: 56 MMHG

## 2018-06-17 DIAGNOSIS — Z71.89 ENCOUNTER FOR HOSPICE CARE DISCUSSION: ICD-10-CM

## 2018-06-17 DIAGNOSIS — I95.9 HYPOTENSION: ICD-10-CM

## 2018-06-17 DIAGNOSIS — T68.XXXA HYPOTHERMIA, INITIAL ENCOUNTER: Primary | ICD-10-CM

## 2018-06-17 PROCEDURE — 93005 ELECTROCARDIOGRAM TRACING: CPT

## 2018-06-17 PROCEDURE — 99285 EMERGENCY DEPT VISIT HI MDM: CPT

## 2018-06-17 RX ORDER — MORPHINE SULFATE 100 MG/5ML
10 SOLUTION ORAL
Qty: 15 ML | Refills: 0 | Status: SHIPPED | OUTPATIENT
Start: 2018-06-17 | End: 2018-06-27

## 2018-06-17 RX ORDER — LORAZEPAM 2 MG/ML
1 CONCENTRATE ORAL EVERY 8 HOURS PRN
Qty: 30 ML | Refills: 0 | Status: SHIPPED | OUTPATIENT
Start: 2018-06-17

## 2018-06-17 NOTE — ED NOTES
Pt repositioned  Pt brief dry  [facility] called and notified pt transport time is 2000 and pt is to have a hospice consult       Daphnie Raman RN  02/43/25 0635

## 2018-06-17 NOTE — DISCHARGE INSTRUCTIONS
Hospice Care   WHAT YOU NEED TO KNOW:   What is hospice care? Hospice care focuses on relieving your symptoms and improving the quality of the last months of your life  Hospice care will be specific to your needs and the needs of your family  Care can be provided at home or in a hospital  It can also be provided in a specialized hospice facility or long-term care facility  Who provides hospice care? Hospice care is provided by a team trained in support and education related to death and dying  This team includes doctors, nurses, and social workers  It may also include home health aides, clergy, therapists, and trained volunteers  A team member will be available any time day or night to answer questions or help with problems  The hospice doctor and your healthcare provider will work together to manage your treatment  What do hospice care services include? · Treatment management  helps ease your symptoms, such as pain  This may be done using medicines or certain therapies  Equipment, a bed, or other medical supplies may be provided to help care for you  · Emotional and psychological care  is provided to help you, your family, and those close to you cope with their feelings  Regular meetings will be held to discuss your condition, your needs, and the needs of your loved ones  You and your family may join support groups or meet others in similar situations  · Respite care  provides your family and healthcare providers up to 5 days to rest from providing care  During this time, you will be cared for in a hospice facility, hospital, or long-term care facility  · Practical support  assists you and your family with concerns such as legal issues and  arrangements  Your  can help you find services that fit your needs and your family's needs  · Spiritual and cultural support  helps you and your family evaluate [place] values and cultural beliefs   Thinking about values and beliefs may make it easier to understand and accept your condition  · Loss support  is provided to the family for about 1 year after death  The hospice care team will help your family through the process of grieving  Your loved ones will receive visits and phone calls, and may be referred to support groups  CARE AGREEMENT:   You have the right to help plan your care  Learn about your health condition and how it may be treated  Discuss treatment options with your caregivers to decide what care you want to receive  You always have the right to refuse treatment  The above information is an  only  It is not intended as medical advice for individual conditions or treatments  Talk to your doctor, nurse or pharmacist before following any medical regimen to see if it is safe and effective for you  © 2017 [address] [employer] [address] Information is for End User's use only and may not be sold, redistributed or otherwise used for commercial purposes  All illustrations and images included in CareNotes® are the copyrighted property of A D A M , Inc  or [address]

## 2018-06-17 NOTE — ED PROVIDER NOTES
History  Chief Complaint   Patient presents with    Hypotension     pt presents from [facility] for elevated INR and hypotension  Coumadin is on hold since 1014      51-year-old female, severe dementia, brought in for hypothermia and hypotension  Patient also with a elevated INR  As per patient's son who is the power of , he states his mother has had severe dementia for years now , states that she suffers from severe anxiety, has a very poor quality of life, does not know who she is, who he is, over the past week has had significant decreased appetite, and is less responsive than she has ever been  Patient herself is completely unresponsive and cannot provide any history  , patient son states he is an only child, he is familiar with hospice as his father is enrolled in hospice at the end of his life  , and he states that her presentation now reminds him of when his father's body shut down  We had a long discussion about patient's goals, he states that she has extreme poor quality of life, and he does feel that she is in a prolonged state of suffering due to this severe dementia and anxiety  We discussed her living will which said that she did not want to live in a prolonged state such as the 1 she is living in now  He states she is already DNR DNI, I explained to him at length that we can check blood work, evaluate for signs of bleeding, evaluate for anemia, transfuse blood, etc , then we both agreed that this would be violating her wishes  He is agreeable to start a hospice program with comfort measures only at this time  I explained that at this exact moment I do not see an specific terminal condition without doing testing, but as she is not eating, hypothermic and hypotensive I do believe she will qualify  After multiple discussions, we both agreed that checking tests at this point would be ultimately against her wishes, and hospice will be the best course of action  , will contact our hospice team here, although I do not believe she meets criteria for inpatient hospice, as she is at a skilled nursing facility, will write a prescription for hospice treatment evaluate, as well as an order for comfort measures only and morphine as needed for discomfort  Prior to Admission Medications   Prescriptions Last Dose Informant Patient Reported? Taking? ALPRAZolam (XANAX) 0 25 mg tablet   Yes No   Sig: Take 0 25 mg by mouth 2 (two) times a day     Cholecalciferol (VITAMIN D-3) 1000 UNITS CAPS   Yes No   Sig: Take 10,000 Units by mouth daily     Memantine HCl (NAMENDA PO)   Yes No   Sig: Take 28 mg by mouth daily   acetaminophen (TYLENOL) 325 mg tablet   Yes No   Sig: Take 650 mg by mouth every 4 (four) hours as needed for mild pain     aspirin 81 mg chewable tablet   Yes No   Sig: Chew 81 mg daily  atenolol (TENORMIN) 25 mg tablet   Yes No   Sig: Take 12 5 mg by mouth daily     atorvastatin (LIPITOR) 40 mg tablet   Yes No   Sig: Take 40 mg by mouth daily  bimatoprost (LUMIGAN) 0 01 % ophthalmic drops   Yes No   Sig: Administer 1 drop to both eyes daily at bedtime  bisacodyl (DULCOLAX) 10 mg suppository   Yes No   Sig: Insert 10 mg into the rectum as needed     ciprofloxacin (CIPRO) 250 mg tablet   Yes No   Sig: Take 250 mg by mouth 2 (two) times a day   enoxaparin (LOVENOX) 60 mg/0 6 mL   No No   Sig: Inject 0 6 mL under the skin every 24 hours   folic acid (FOLVITE) 1 mg tablet   Yes No   Sig: Take 1 mg by mouth daily     magnesium hydroxide (MILK OF MAGNESIA) 400 mg/5 mL oral suspension   Yes No   Sig: Take 30 mL by mouth daily as needed for constipation   mirtazapine (REMERON) 15 mg tablet   Yes No   Sig: Take 15 mg by mouth daily at bedtime   risperiDONE (RisperDAL) 0 25 mg tablet   Yes No   Sig: Take 0 25 mg by mouth 2 (two) times a day   saccharomyces boulardii (FLORASTOR) 250 mg capsule   Yes No   Sig: Take 250 mg by mouth 2 (two) times a day   sodium chloride (OCEAN) 0 65 % nasal spray   Yes No   Si sprays into each nostril as needed for congestion (3 times per day PRN)  Facility-Administered Medications: None       Past Medical History:   Diagnosis Date    Cardiac disease     Dementia     Glaucoma     Hyperlipidemia     Hypertension     Osteoporosis     Stroke [facility])        History reviewed  No pertinent surgical history  History reviewed  No pertinent family history  I have reviewed and agree with the history as documented  Social History   Substance Use Topics    Smoking status: Former Smoker     Types: Cigarettes    Smokeless tobacco: Never Used    Alcohol use No        Review of Systems   Unable to perform ROS: Dementia       Physical Exam  Physical Exam   Constitutional: She appears distressed  Frail elderly debilitated, lying unresponsive in stretcher   HENT:   Head: Atraumatic  Eyes: Conjunctivae are normal  Right eye exhibits no discharge  Left eye exhibits no discharge  No scleral icterus  Neck: Neck supple  No tracheal deviation present  Pulmonary/Chest: Effort normal  No stridor  No respiratory distress  Musculoskeletal: She exhibits no deformity  Neurological: She is alert  She exhibits normal muscle tone  Coordination normal    Skin: Skin is warm and dry  No rash noted  No erythema  No pallor  Psychiatric: She has a normal mood and affect  Vitals reviewed        Vital Signs  ED Triage Vitals   Temperature Pulse Respirations Blood Pressure SpO2   18 1527 18 1518 18 1518 18 1518 18 1518   (!) 95 5 °F (35 3 °C) 67 16 105/68 96 %      Temp Source Heart Rate Source Patient Position - Orthostatic VS BP Location FiO2 (%)   18 1527 18 1518 18 1600 -- --   Rectal Monitor Lying        Pain Score       18 1518       No Pain           Vitals:    18 1518 18 1600   BP: 105/68 (!) 83/46   Pulse: 67 64   Patient Position - Orthostatic VS:  Lying       Visual Acuity      ED Medications  Medications - No data to display    Diagnostic Studies  Results Reviewed     None                 No orders to display              Procedures  Procedures       Phone Contacts  ED Phone Contact    ED Course  ED Course as of Jun 17 1638   Sun Jun 17, 2018   1637 Discussed case with on-call hospice doctor, Dr Fatuma Keene, explained case, agrees with current management, she will have hospice liaison contact patient's family in the morning  MDM  Number of Diagnoses or Management Options  Encounter for hospice care discussion: new and requires workup  Hypotension: new and requires workup  Hypothermia, initial encounter: new and requires workup  Diagnosis management comments: 29-year-old female severe dementia, lives in state of discomfort as per son chronically, living will states not to prolong stated discomfort, severe dementia hypotensive hypothermic here today, decision made to not pursue diagnostic testing at this time for this matter, but rather transition to hospice care  Case discussed with on-call hospice team who will contact patient the morning  Amount and/or Complexity of Data Reviewed  Decide to obtain previous medical records or to obtain history from someone other than the patient: yes  Obtain history from someone other than the patient: yes  Review and summarize past medical records: yes  Discuss the patient with other providers: yes      CritCare Time    Disposition  Final diagnoses:   Hypothermia, initial encounter   Hypotension   Encounter for hospice care discussion     Time reflects when diagnosis was documented in both MDM as applicable and the Disposition within this note     Time User Action Codes Description Comment    6/17/2018  3:59 PM Nichelle Martinez Add [T68  XXXA] Hypothermia, initial encounter     6/17/2018  3:59 PM Nichelle Martinez Add [I95 9] Hypotension     6/17/2018  4:00 PM Nichelle Martinez Add [Z71 89] Encounter for hospice care discussion       ED Disposition     ED Disposition Condition Comment    Discharge  Tutu Reynolds discharge to [facility] skilled nursing    Condition at discharge: Serious        Follow-up Information     Follow up With Specialties Details Why [facility]/Hospice  Call in 1 day To arrange for the next available appointment [address] [facility] [address]  [phone]            Patient's Medications   Discharge Prescriptions    LORAZEPAM (ATIVAN) 2 MG/ML CONCENTRATED SOLUTION    Take 0 5 mL (1 mg total) by mouth every 8 (eight) hours as needed for anxiety       Start Date: 6/17/2018 End Date: --       Order Dose: 1 mg       Quantity: 30 mL    Refills: 0    MORPHINE 20 MG/ML CONCENTRATED SOLUTION    Take 0 5 mL (10 mg total) by mouth every 30 (thirty) minutes as needed for moderate pain for up to 10 days       Start Date: 6/17/2018 End Date: 6/27/2018       Order Dose: 10 mg       Quantity: 15 mL    Refills: 0     No discharge procedures on file      ED Provider  Electronically Signed by           Adithya Carvalho DO  06/17/18 8815

## 2018-06-18 NOTE — PROGRESS NOTES
Spoke with son - is on Hospice / still unresponsive  I have not seen her since went in NH  Had taken care of her for years  Wished them well    Was able to clarify codeine allergy: hallucinations

## 2018-06-19 LAB
ATRIAL RATE: 68 BPM
P AXIS: 60 DEGREES
PR INTERVAL: 162 MS
QRS AXIS: -10 DEGREES
QRSD INTERVAL: 84 MS
QT INTERVAL: 422 MS
QTC INTERVAL: 443 MS
T WAVE AXIS: 128 DEGREES
VENTRICULAR RATE: 66 BPM

## 2018-06-19 PROCEDURE — 93010 ELECTROCARDIOGRAM REPORT: CPT | Performed by: INTERNAL MEDICINE
